# Patient Record
Sex: MALE | Race: BLACK OR AFRICAN AMERICAN | NOT HISPANIC OR LATINO | Employment: FULL TIME | URBAN - METROPOLITAN AREA
[De-identification: names, ages, dates, MRNs, and addresses within clinical notes are randomized per-mention and may not be internally consistent; named-entity substitution may affect disease eponyms.]

---

## 2024-04-27 ENCOUNTER — APPOINTMENT (EMERGENCY)
Dept: RADIOLOGY | Facility: HOSPITAL | Age: 34
End: 2024-04-27
Payer: COMMERCIAL

## 2024-04-27 ENCOUNTER — HOSPITAL ENCOUNTER (EMERGENCY)
Facility: HOSPITAL | Age: 34
Discharge: HOME | End: 2024-04-27
Attending: EMERGENCY MEDICINE
Payer: COMMERCIAL

## 2024-04-27 VITALS
WEIGHT: 224.21 LBS | HEART RATE: 72 BPM | TEMPERATURE: 97.6 F | HEIGHT: 69 IN | OXYGEN SATURATION: 99 % | DIASTOLIC BLOOD PRESSURE: 69 MMHG | BODY MASS INDEX: 33.21 KG/M2 | SYSTOLIC BLOOD PRESSURE: 127 MMHG | RESPIRATION RATE: 18 BRPM

## 2024-04-27 DIAGNOSIS — F41.9 ANXIETY: ICD-10-CM

## 2024-04-27 DIAGNOSIS — R03.0 ELEVATED BLOOD PRESSURE READING: ICD-10-CM

## 2024-04-27 DIAGNOSIS — R06.00 DYSPNEA: Primary | ICD-10-CM

## 2024-04-27 DIAGNOSIS — R10.9 ACUTE ABDOMINAL PAIN: ICD-10-CM

## 2024-04-27 LAB
ALBUMIN SERPL BCP-MCNC: 4.3 G/DL (ref 3.5–5)
ALP SERPL-CCNC: 63 U/L (ref 34–104)
ALT SERPL W P-5'-P-CCNC: 20 U/L (ref 7–52)
ANION GAP SERPL CALCULATED.3IONS-SCNC: 6 MMOL/L (ref 4–13)
AST SERPL W P-5'-P-CCNC: 19 U/L (ref 13–39)
BASOPHILS # BLD AUTO: 0.03 THOUSANDS/ÂΜL (ref 0–0.1)
BASOPHILS NFR BLD AUTO: 0 % (ref 0–1)
BILIRUB SERPL-MCNC: 0.47 MG/DL (ref 0.2–1)
BUN SERPL-MCNC: 11 MG/DL (ref 5–25)
CALCIUM SERPL-MCNC: 9.3 MG/DL (ref 8.4–10.2)
CARDIAC TROPONIN I PNL SERPL HS: 2 NG/L
CHLORIDE SERPL-SCNC: 107 MMOL/L (ref 96–108)
CO2 SERPL-SCNC: 22 MMOL/L (ref 21–32)
CREAT SERPL-MCNC: 0.81 MG/DL (ref 0.6–1.3)
EOSINOPHIL # BLD AUTO: 0.07 THOUSAND/ÂΜL (ref 0–0.61)
EOSINOPHIL NFR BLD AUTO: 1 % (ref 0–6)
ERYTHROCYTE [DISTWIDTH] IN BLOOD BY AUTOMATED COUNT: 13.8 % (ref 11.6–15.1)
GFR SERPL CREATININE-BSD FRML MDRD: 115 ML/MIN/1.73SQ M
GLUCOSE SERPL-MCNC: 99 MG/DL (ref 65–140)
HCT VFR BLD AUTO: 47.7 % (ref 36.5–49.3)
HGB BLD-MCNC: 14.3 G/DL (ref 12–17)
IMM GRANULOCYTES # BLD AUTO: 0.01 THOUSAND/UL (ref 0–0.2)
IMM GRANULOCYTES NFR BLD AUTO: 0 % (ref 0–2)
LIPASE SERPL-CCNC: 17 U/L (ref 11–82)
LYMPHOCYTES # BLD AUTO: 3.23 THOUSANDS/ÂΜL (ref 0.6–4.47)
LYMPHOCYTES NFR BLD AUTO: 48 % (ref 14–44)
MCH RBC QN AUTO: 22.7 PG (ref 26.8–34.3)
MCHC RBC AUTO-ENTMCNC: 30 G/DL (ref 31.4–37.4)
MCV RBC AUTO: 76 FL (ref 82–98)
MONOCYTES # BLD AUTO: 0.64 THOUSAND/ÂΜL (ref 0.17–1.22)
MONOCYTES NFR BLD AUTO: 9 % (ref 4–12)
NEUTROPHILS # BLD AUTO: 2.86 THOUSANDS/ÂΜL (ref 1.85–7.62)
NEUTS SEG NFR BLD AUTO: 42 % (ref 43–75)
NRBC BLD AUTO-RTO: 0 /100 WBCS
PLATELET # BLD AUTO: 255 THOUSANDS/UL (ref 149–390)
PMV BLD AUTO: 11.3 FL (ref 8.9–12.7)
POTASSIUM SERPL-SCNC: 4.1 MMOL/L (ref 3.5–5.3)
PROT SERPL-MCNC: 7.7 G/DL (ref 6.4–8.4)
RBC # BLD AUTO: 6.3 MILLION/UL (ref 3.88–5.62)
SODIUM SERPL-SCNC: 135 MMOL/L (ref 135–147)
WBC # BLD AUTO: 6.84 THOUSAND/UL (ref 4.31–10.16)

## 2024-04-27 PROCEDURE — 99285 EMERGENCY DEPT VISIT HI MDM: CPT | Performed by: EMERGENCY MEDICINE

## 2024-04-27 PROCEDURE — 84484 ASSAY OF TROPONIN QUANT: CPT | Performed by: EMERGENCY MEDICINE

## 2024-04-27 PROCEDURE — 96360 HYDRATION IV INFUSION INIT: CPT

## 2024-04-27 PROCEDURE — 36415 COLL VENOUS BLD VENIPUNCTURE: CPT | Performed by: EMERGENCY MEDICINE

## 2024-04-27 PROCEDURE — 71046 X-RAY EXAM CHEST 2 VIEWS: CPT

## 2024-04-27 PROCEDURE — 83690 ASSAY OF LIPASE: CPT | Performed by: EMERGENCY MEDICINE

## 2024-04-27 PROCEDURE — 85025 COMPLETE CBC W/AUTO DIFF WBC: CPT | Performed by: EMERGENCY MEDICINE

## 2024-04-27 PROCEDURE — 99285 EMERGENCY DEPT VISIT HI MDM: CPT

## 2024-04-27 PROCEDURE — 80053 COMPREHEN METABOLIC PANEL: CPT | Performed by: EMERGENCY MEDICINE

## 2024-04-27 PROCEDURE — 93005 ELECTROCARDIOGRAM TRACING: CPT

## 2024-04-27 RX ORDER — IBUPROFEN 400 MG/1
400 TABLET ORAL ONCE
Status: DISCONTINUED | OUTPATIENT
Start: 2024-04-27 | End: 2024-04-27

## 2024-04-27 RX ORDER — ALBUTEROL SULFATE 90 UG/1
2 AEROSOL, METERED RESPIRATORY (INHALATION) EVERY 6 HOURS PRN
COMMUNITY
Start: 2024-03-18

## 2024-04-27 RX ADMIN — SODIUM CHLORIDE 500 ML: 0.9 INJECTION, SOLUTION INTRAVENOUS at 18:08

## 2024-04-27 NOTE — DISCHARGE INSTRUCTIONS
You were evaluated in the emergency department for: shortness of breath. You can access your current and pending results through [facility] [employer]. A radiologist will take a second look at your X-Rays, if you had any, and you will be contacted with any new findings.     You should follow-up with your primary care provider, as soon as possible, for re-evaluation.  If you do not have a primary care provider, I have referred you to [facility]. You will be contacted about scheduling an appointment. Their phone number is also included on this paperwork.  You have also been referred to gastroenterology and you should follow-up with them as well.    You may take 650mg of tylenol every four to six hours, not exceeding 3,000mg daily, for the management of your discomfort.     Your workup revealed no emergent features at this time; however, many disease processes are dynamic:    Please, return to the emergency department if you experience new or worsening symptoms, fever, chest pain, shortness of breath, difficulty breathing, dizziness, abdominal pain, persistent nausea/vomiting, syncope or passing out, blood in your urine or stool, coughing up blood, leg swelling/pain, urinary retention, bowel or bladder incontinence, numbness between your legs.    Additionally, your blood pressure was measured to be high. This is something that you should discuss with your primary care provider and have re-checked within one week.

## 2024-04-27 NOTE — ED PROVIDER NOTES
History  Chief Complaint   Patient presents with    Shortness of Breath     Reports SOB that began today. Dx with a URI earlier this month. Has been using an inhaler with no relief.     Patient is a 34-year-old male, with a history significant for anxiety per my review of the medical record and self-reported panic attacks, who presents to the ED today due to sudden onset dyspnea 30 minutes ago.  Patient reports associated anxiety, palpitations, extremity paresthesias, burning in character atraumatic periumbilical abdominal discomfort that is mild in intensity and nearly resolved.  There is no associated chest pain, fever, blood in stool, testicular swelling/pain, urinary symptoms, cough, history of VTE, known family history of cardiac disease at a young age, drug use, hemoptysis, recent trauma or surgery, back pain, headache, calf pain/swelling.  Patient reports improvement in symptoms since they started.  He denies any triggers for his anxiety.  Notably, per my review of the medical record, patient has had multiple evaluations for chest pain/dyspnea/palpitations/dizziness and he has had multiple negative D-dimers most recently in February of this year.  No clear exacerbating or remitting factors.  Per my review of the medical record, patient was also treated for URI/bronchitis in urgent care on 3/28/24 for and prescribed Augmentin and albuterol.  Patient is without other concerns at this time.        Prior to Admission Medications   Prescriptions Last Dose Informant Patient Reported? Taking?   albuterol (PROVENTIL HFA,VENTOLIN HFA) 90 mcg/act inhaler   Yes Yes   Sig: Inhale 2 puffs every 6 (six) hours as needed      Facility-Administered Medications: None       History reviewed. No pertinent past medical history.    History reviewed. No pertinent surgical history.    History reviewed. No pertinent family history.  I have reviewed and agree with the history as documented.    E-Cigarette/Vaping     E-Cigarette/Vaping  Substances     Social History     Tobacco Use    Smoking status: Never    Smokeless tobacco: Never   Substance Use Topics    Alcohol use: Yes     Comment: occ    Drug use: Never       Review of Systems   Constitutional:  Negative for fever.   HENT:  Negative for trouble swallowing.    Eyes:  Negative for visual disturbance.   Respiratory:  Positive for shortness of breath. Negative for cough.    Cardiovascular:  Positive for palpitations. Negative for chest pain.   Gastrointestinal:  Positive for abdominal pain. Negative for blood in stool and vomiting.   Endocrine: Negative for polyuria.   Genitourinary:  Negative for dysuria, scrotal swelling and testicular pain.   Musculoskeletal:  Negative for gait problem.   Skin:  Negative for rash.   Allergic/Immunologic: Negative for environmental allergies.   Neurological:  Negative for weakness and numbness (Not currently present).   Hematological:  Negative for adenopathy.   Psychiatric/Behavioral:  Negative for confusion.    All other systems reviewed and are negative.      Physical Exam  Physical Exam  Vitals and nursing note reviewed.   Constitutional:       General: He is not in acute distress.     Appearance: He is not ill-appearing, toxic-appearing or diaphoretic.      Comments: Patient appears comfortable during my evaluation    HENT:      Head: Normocephalic and atraumatic.      Right Ear: External ear normal.      Left Ear: External ear normal.      Nose: Nose normal. No rhinorrhea.      Mouth/Throat:      Mouth: Mucous membranes are moist.      Pharynx: Oropharynx is clear. No oropharyngeal exudate or posterior oropharyngeal erythema.      Comments: Uvula midline. No oropharyngeal or submandibular mass/swelling  Eyes:      General: No scleral icterus.        Right eye: No discharge.         Left eye: No discharge.      Conjunctiva/sclera: Conjunctivae normal.      Pupils: Pupils are equal, round, and reactive to light.   Neck:      Comments: Patient is  spontaneously rotating their neck to the left and right during the history and physical exam interaction without difficulty or apparent discomfort    Cardiovascular:      Rate and Rhythm: Normal rate and regular rhythm.      Pulses: Normal pulses.      Heart sounds: Normal heart sounds. No murmur heard.     No friction rub. No gallop.      Comments: 2+ Radial    Patient was initially recorded as tachycardic; however, on my evaluation the patient and after discussion with patient, patient's heart rate improved to the 90s spontaneously  Pulmonary:      Effort: Pulmonary effort is normal. No respiratory distress.      Breath sounds: Normal breath sounds. No stridor. No wheezing, rhonchi or rales.   Abdominal:      General: Abdomen is flat. There is no distension.      Palpations: Abdomen is soft. There is no mass.      Tenderness: There is abdominal tenderness (Periumbilical/left periumbilical). There is no right CVA tenderness, left CVA tenderness, guarding or rebound.      Hernia: No hernia is present.   Genitourinary:     Comments: No discomfort with self palpation of the testicles  Musculoskeletal:         General: No tenderness (No pain with calf squeeze) or deformity.      Cervical back: Neck supple. No tenderness. No muscular tenderness.      Right lower leg: No edema.      Left lower leg: No edema.   Lymphadenopathy:      Cervical: No cervical adenopathy.   Skin:     General: Skin is warm and dry.      Capillary Refill: Capillary refill takes less than 2 seconds.   Neurological:      Mental Status: He is alert.      Comments: Patient is speaking clearly in complete sentences.  Patient is answering appropriately and able follow commands.  Patient is moving all four extremities spontaneously.  No facial droop.  Tongue midline.     No sensory deficit with palpation of the lower extremities   Psychiatric:         Behavior: Behavior normal.      Comments: Anxious mood and affect         Vital Signs  ED Triage Vitals    Temperature Pulse Respirations Blood Pressure SpO2   04/27/24 1742 04/27/24 1742 04/27/24 1742 04/27/24 1742 04/27/24 1742   97.6 °F (36.4 °C) (!) 112 20 163/94 100 %      Temp Source Heart Rate Source Patient Position - Orthostatic VS BP Location FiO2 (%)   04/27/24 1742 04/27/24 1815 04/27/24 1742 04/27/24 1742 --   Oral Monitor Lying Right arm       Pain Score       04/27/24 1742       No Pain           Vitals:    04/27/24 1758 04/27/24 1815 04/27/24 1836 04/27/24 1839   BP:  144/87     Pulse: 98 91 91 85   Patient Position - Orthostatic VS:  Lying           Visual Acuity      ED Medications  Medications   sodium chloride 0.9 % bolus 500 mL (500 mL Intravenous New Bag 4/27/24 1808)       Diagnostic Studies  Results Reviewed       Procedure Component Value Units Date/Time    Comprehensive metabolic panel [[id number]] Updated: 04/27/24 1905    Lab Status: No result Specimen: Blood from Arm, Right     Lipase [[id number]] Updated: 04/27/24 1905    Lab Status: No result Specimen: Blood from Arm, Right     HS Troponin 0hr (reflex protocol) [[id number]]  (Normal) Collected: 04/27/24 1806    Lab Status: Final result Specimen: Blood from Arm, Right Updated: 04/27/24 1846     hs TnI 0hr 2 ng/L     CBC and differential [[id number]]  (Abnormal) Collected: 04/27/24 1806    Lab Status: Final result Specimen: Blood from Arm, Right Updated: 04/27/24 1818     WBC 6.84 Thousand/uL      RBC 6.30 Million/uL      Hemoglobin 14.3 g/dL      Hematocrit 47.7 %      MCV 76 fL      MCH 22.7 pg      MCHC 30.0 g/dL      RDW 13.8 %      MPV 11.3 fL      Platelets 255 Thousands/uL      nRBC 0 /100 WBCs      Segmented % 42 %      Immature Grans % 0 %      Lymphocytes % 48 %      Monocytes % 9 %      Eosinophils Relative 1 %      Basophils Relative 0 %      Absolute Neutrophils 2.86 Thousands/µL      Absolute Immature Grans 0.01 Thousand/uL      Absolute Lymphocytes 3.23 Thousands/µL      Absolute Monocytes 0.64 Thousand/µL      Eosinophils  Absolute 0.07 Thousand/µL      Basophils Absolute 0.03 Thousands/µL                    XR chest 2 views   ED Interpretation by Alexis Stephens MD (04/27 1818)   Per my independent interpretation: No acute cardiopulmonary process                 Procedures  POC Cardiac US    Date/Time: 4/27/2024 6:07 PM    Performed by: Alexis Stephens MD  Authorized by: Alexis Stephens MD    Patient location:  ED  Procedure details:     Exam Type:  Diagnostic    Indications: dyspnea      Assessment / Evaluation for: cardiac function and pericardial effusion      Exam Type: initial exam      Image quality: diagnostic      Image availability:  Images available in PACS  Patient Details:     Cardiac Rhythm:  Regular    Mechanical ventilation: No    Cardiac findings:     Echo technique: limited 2D      Views obtained: parasternal long axis, parasternal short axis and subcostal      Pericardial effusion: absent      Tamponade physiology: absent      LV systolic function: normal      RV dilation: none             ED Course  ED Course as of 04/27/24 1909   Sat Apr 27, 2024   1803 ECG per my independent interpretation: Tachycardic rate, regular rhythm, no ectopy, normal axis, no ST elevations or depressions     1818 Hemoglobin: 14.3  WNL   1818 WBC: 6.84  WNL   1846 On reevaluation, patient reports resolution of symptoms.  He currently denies abdominal pain and dyspnea and states he is in his usual state of health.   1846 hs TnI 0hr: 2  WNL -given chest pain, no need for delta.  Very low suspicion for ACS   1847 Patient declined ibuprofen   1909 Patient signed out prior to final disposition                               SBIRT 20yo+      Flowsheet Row Most Recent Value   Initial Alcohol Screen: US AUDIT-C     1. How often do you have a drink containing alcohol? 1 Filed at: 04/27/2024 1742   2. How many drinks containing alcohol do you have on a typical day you are drinking?  2 Filed at: 04/27/2024 1742   3a. Male UNDER 65:  How often do you have five or more drinks on one occasion? 0 Filed at: 04/27/2024 1742   3b. FEMALE Any Age, or MALE 65+: How often do you have 4 or more drinks on one occassion? 0 Filed at: 04/27/2024 1742   Audit-C Score 3 Filed at: 04/27/2024 1742   [place]: How many times in the past year have you...    Used an illegal drug or used a prescription medication for non-medical reasons? Never Filed at: 04/27/2024 1742                      Medical Decision Making  Patient is a 34-year-old male, with a history significant for anxiety per my review of the medical record and self-reported panic attacks, who presents to the ED today due to sudden onset dyspnea 30 minutes ago.  Patient reports associated anxiety, palpitations, extremity paresthesias, burning in character atraumatic periumbilical abdominal discomfort that is mild in intensity and nearly resolved.  There is no associated chest pain, fever, blood in stool, testicular swelling/pain, urinary symptoms, cough, history of VTE, known family history of cardiac disease at a young age, drug use, hemoptysis, recent trauma or surgery, back pain, headache, calf pain/swelling.  Patient reports improvement in symptoms since they started.  He denies any triggers for his anxiety.  Notably, per my review of the medical record, patient has had multiple evaluations for chest pain/dyspnea/palpitations/dizziness and he has had multiple negative D-dimers most recently in February of this year.  No clear exacerbating or remitting factors.  Per my review of the medical record, patient was also treated for URI/bronchitis in urgent care on 3/28/24 for and prescribed Augmentin and albuterol.  Patient has also had previous imaging of his abdomen that showed no diverticular disease.  Patient is currently afebrile and hemodynamically stable.  Initial tachycardia resolved as I was talking with patient.  Physical exam is notable for clear heart and lungs, soft abdomen with periumbilical  discomfort to palpation but no guarding or rebound, negative Ball sign, negative McBurney point, no signs of DVT. SpO2 100%.  Bedside ultrasound is notable for no right ventricular dilation or pericardial effusion.  This presentation is concerning for: Anxiety/panic attack.  I also considered ACS, anemia, pneumothorax, electrolyte abnormality, pancreatitis.  No reason to suspect pericarditis/myocarditis, biliary pathology, appendicitis, bowel obstruction, diverticular disease, aortic dissection, testicular torsion, urolithiasis at this time based on history physical exam.  Patient at risk for pneumonia.  Will investigate with cardiac workup, lipase.  Will manage with fluids, ibuprofen, further based on workup.    Amount and/or Complexity of Data Reviewed  Labs: ordered. Decision-making details documented in ED Course.  Radiology: ordered and independent interpretation performed.    Risk  Prescription drug management.             Disposition  Final diagnoses:   Acute abdominal pain   Elevated blood pressure reading   Dyspnea   Anxiety     Time reflects when diagnosis was documented in both MDM as applicable and the Disposition within this note       Time User Action Codes Description Comment    4/27/2024  6:02 PM Legare, Christopher A Add [R10.9] Acute abdominal pain     4/27/2024  6:02 PM Legare, Christopher A Add [R03.0] Elevated blood pressure reading     4/27/2024  6:02 PM Legare, Christopher A Add [R06.00] Dyspnea     4/27/2024  6:19 PM Legare, Christopher A Modify [R10.9] Acute abdominal pain     4/27/2024  6:19 PM Legare, Christopher A Modify [R06.00] Dyspnea     4/27/2024  6:19 PM Legare, Christopher A Add [F41.9] Anxiety           ED Disposition       ED Disposition   Discharge    Condition   Stable    Date/Time   Sat Apr 27, 2024 0398    Comment   Lissy Ward discharge to home/self care.                   Follow-up Information       Follow up With Specialties Details Why Contact Info Additional  Information    Bishop Folye MD Family Medicine Schedule an appointment as soon as possible for a visit   [address]  [address]  [place] [address] [phone]       [facility] Specialty [facility]astroenterology Schedule an appointment as soon as possible for a visit   [address]  [place] [id number] [phone] [facility], [address], [place], [address], [phone]            Patient's Medications   Discharge Prescriptions    No medications on file           PDMP Review       None            ED Provider  Electronically Signed by             Alexis Stephens MD  04/27/24 1908       Alexis Stephens MD  04/27/24 1916

## 2024-04-28 LAB
ATRIAL RATE: 110 BPM
P AXIS: 47 DEGREES
PR INTERVAL: 204 MS
QRS AXIS: 83 DEGREES
QRSD INTERVAL: 96 MS
QT INTERVAL: 330 MS
QTC INTERVAL: 446 MS
T WAVE AXIS: -1 DEGREES
VENTRICULAR RATE: 110 BPM

## 2024-04-28 PROCEDURE — 93010 ELECTROCARDIOGRAM REPORT: CPT | Performed by: INTERNAL MEDICINE
